# Patient Record
Sex: MALE | Race: WHITE | Employment: FULL TIME | URBAN - METROPOLITAN AREA
[De-identification: names, ages, dates, MRNs, and addresses within clinical notes are randomized per-mention and may not be internally consistent; named-entity substitution may affect disease eponyms.]

---

## 2017-01-10 PROBLEM — Z85.820 PERSONAL HISTORY OF MALIGNANT MELANOMA: Status: ACTIVE | Noted: 2017-01-10

## 2017-12-02 ENCOUNTER — OFFICE VISIT (OUTPATIENT)
Dept: FAMILY MEDICINE CLINIC | Facility: CLINIC | Age: 37
End: 2017-12-02

## 2017-12-02 VITALS
BODY MASS INDEX: 28.91 KG/M2 | WEIGHT: 193 LBS | HEIGHT: 68.5 IN | HEART RATE: 64 BPM | OXYGEN SATURATION: 98 % | TEMPERATURE: 98 F | DIASTOLIC BLOOD PRESSURE: 74 MMHG | RESPIRATION RATE: 16 BRPM | SYSTOLIC BLOOD PRESSURE: 116 MMHG

## 2017-12-02 DIAGNOSIS — H10.33 ACUTE BACTERIAL CONJUNCTIVITIS OF BOTH EYES: Primary | ICD-10-CM

## 2017-12-02 PROCEDURE — 99203 OFFICE O/P NEW LOW 30 MIN: CPT | Performed by: NURSE PRACTITIONER

## 2017-12-02 RX ORDER — TOBRAMYCIN 3 MG/ML
2 SOLUTION/ DROPS OPHTHALMIC EVERY 4 HOURS
Qty: 1 BOTTLE | Refills: 0 | Status: SHIPPED | OUTPATIENT
Start: 2017-12-02 | End: 2017-12-07

## 2017-12-02 NOTE — PATIENT INSTRUCTIONS
1. Rest. Avoid rubbing or touching the eye when possible. 2. Tobramycin as prescribed. 3. Wash hands frequently. 4. Compresses as discussed.   5. Follow up with PMD in 3-4 days for reeval. Follow up sooner or go to the emergency department immediately if

## 2017-12-02 NOTE — PROGRESS NOTES
CHIEF COMPLAINT:   Patient presents with:  Conjunctivitis      HPI:   Miguel Lundberg is a 40year old male who presents with chief complaint of \"pink eye\". Symptoms began  3  days ago. Symptoms have been constant since onset.    Patient reports arias prabhakar GENERAL: well developed, well nourished,in no apparent distress  SKIN: no rashes,no suspicious lesions  EYES: PERRLA, EOMI, bilat conjunctiva erythematous, injected, yellow discharge from inner canthus.  No swelling or erythema noted to eyelids or periorbit Conjunctivitis is an irritation or infection. It affects the membrane that covers the white of your eye and the inside of your eyelid (conjunctiva). It can happen to one or both eyes. The membrane swells and the blood vessels enlarge (dilate).  This makes y

## 2017-12-07 ENCOUNTER — TELEPHONE (OUTPATIENT)
Dept: FAMILY MEDICINE CLINIC | Facility: CLINIC | Age: 37
End: 2017-12-07

## 2017-12-07 DIAGNOSIS — H10.33 ACUTE BACTERIAL CONJUNCTIVITIS OF BOTH EYES: ICD-10-CM

## 2017-12-07 RX ORDER — TOBRAMYCIN 3 MG/ML
2 SOLUTION/ DROPS OPHTHALMIC EVERY 4 HOURS
Qty: 1 BOTTLE | Refills: 0 | Status: SHIPPED | OUTPATIENT
Start: 2017-12-07 | End: 2017-12-14

## 2017-12-07 NOTE — TELEPHONE ENCOUNTER
Patient reports he needs a refill on his tobramycin eye drops. He explains the drops were in his pocket and started leaking. Conjunctivitis sxs are improving.

## 2018-02-19 ENCOUNTER — CHARTING TRANS (OUTPATIENT)
Dept: OTHER | Age: 38
End: 2018-02-19

## 2018-02-19 ENCOUNTER — TELEPHONE (OUTPATIENT)
Dept: FAMILY MEDICINE CLINIC | Facility: CLINIC | Age: 38
End: 2018-02-19

## 2018-02-19 NOTE — TELEPHONE ENCOUNTER
Patient calling call center requesting eye drop refill. Called patient back on cell and left VM. Advised he needs to be seen again and unable to provide refills.

## 2018-03-09 ENCOUNTER — CHARTING TRANS (OUTPATIENT)
Dept: OTHER | Age: 38
End: 2018-03-09

## 2018-11-01 VITALS
BODY MASS INDEX: 28.49 KG/M2 | HEIGHT: 68 IN | TEMPERATURE: 100 F | HEART RATE: 100 BPM | SYSTOLIC BLOOD PRESSURE: 118 MMHG | DIASTOLIC BLOOD PRESSURE: 72 MMHG | RESPIRATION RATE: 20 BRPM | WEIGHT: 188 LBS

## 2018-11-01 VITALS
SYSTOLIC BLOOD PRESSURE: 120 MMHG | HEART RATE: 60 BPM | DIASTOLIC BLOOD PRESSURE: 70 MMHG | HEIGHT: 68 IN | RESPIRATION RATE: 18 BRPM | WEIGHT: 190 LBS | TEMPERATURE: 97.7 F | BODY MASS INDEX: 28.79 KG/M2

## 2018-11-29 ENCOUNTER — CHARTING TRANS (OUTPATIENT)
Dept: OTHER | Age: 38
End: 2018-11-29

## 2021-10-19 PROBLEM — L64.9 MALE PATTERN BALDNESS: Status: ACTIVE | Noted: 2021-10-19

## 2021-10-19 PROBLEM — F41.9 ANXIETY: Status: ACTIVE | Noted: 2021-10-19

## 2022-09-06 ENCOUNTER — LAB REQUISITION (OUTPATIENT)
Dept: LAB | Facility: HOSPITAL | Age: 42
End: 2022-09-06
Payer: COMMERCIAL

## 2022-09-06 DIAGNOSIS — D48.5 NEOPLASM OF UNCERTAIN BEHAVIOR OF SKIN: ICD-10-CM

## 2022-09-06 PROCEDURE — 88305 TISSUE EXAM BY PATHOLOGIST: CPT | Performed by: DERMATOLOGY
